# Patient Record
Sex: FEMALE | Race: BLACK OR AFRICAN AMERICAN
[De-identification: names, ages, dates, MRNs, and addresses within clinical notes are randomized per-mention and may not be internally consistent; named-entity substitution may affect disease eponyms.]

---

## 2020-09-24 ENCOUNTER — HOSPITAL ENCOUNTER (OUTPATIENT)
Dept: HOSPITAL 62 - RAD | Age: 60
End: 2020-09-24
Attending: INTERNAL MEDICINE
Payer: MEDICAID

## 2020-09-24 DIAGNOSIS — R09.89: Primary | ICD-10-CM

## 2020-09-24 PROCEDURE — 93880 EXTRACRANIAL BILAT STUDY: CPT

## 2020-09-24 NOTE — RADIOLOGY REPORT (SQ)
EXAM DESCRIPTION:  CAROTID DOPPLER



IMAGES COMPLETED DATE/TIME:  9/24/2020 4:04 pm



REASON FOR STUDY:  CIRCULATORY SYMPTOMS R09.89  OTH SYMPTOMS AND SIGNS INVOLVING THE CIRC AND RESP SY




COMPARISON:  None.



TECHNIQUE:  Grayscale ultrasound, Doppler velocity and spectra, and color Doppler images acquired of 
the extra-cranial carotid and vertebral arteries. Images stored on PACS.



LIMITATIONS:  None.



FINDINGS:  RIGHT CAROTID

CCA Velocities: Within normal limits.

ICA Velocities

 Peak systolic 0.66 m/s.

 End diastolic 0.32 m/s.

Proximal ICA/CCA peak systolic ratio 1.4.

Mild plaque in the bifurcation.

LEFT CAROTID

CCA Velocities: Within normal limits.

ICA Velocities

 Peak systolic 1.01 m/s.

 End diastolic 0.40 m/s.

Proximal ICA/CCA peak systolic ratio 1.7.

Spectra normal. No significant plaque.

VERTEBRAL ARTERIES: Antegrade flow.  Normal waveforms.

SUBCLAVIAN ARTERIES: Not imaged.

OTHER: No other significant finding.



IMPRESSION:  NO HEMODYNAMICALLY SIGNIFICANT STENOSIS.



COMMENT:  Quality ID #[id number]:  Velocity criteria are extrapolated from the diameter data as defined by t
he Society of Radiologists in Ultrasound Consensus Conference. Radiology 2003: 229; 340-346.



TECHNICAL DOCUMENTATION:  JOB ID:  [id number]

 2011 [employer]- All Rights Reserved



Reading location - IP/workstation name: [email or web address]